# Patient Record
Sex: FEMALE | Race: WHITE | NOT HISPANIC OR LATINO | Employment: UNEMPLOYED | URBAN - METROPOLITAN AREA
[De-identification: names, ages, dates, MRNs, and addresses within clinical notes are randomized per-mention and may not be internally consistent; named-entity substitution may affect disease eponyms.]

---

## 2022-04-04 ENCOUNTER — TRANSCRIBE ORDERS (OUTPATIENT)
Dept: OBSTETRICS AND GYNECOLOGY | Facility: HOSPITAL | Age: 22
End: 2022-04-04

## 2022-04-04 DIAGNOSIS — O09.899 HISTORY OF PRETERM DELIVERY, CURRENTLY PREGNANT: ICD-10-CM

## 2022-04-04 DIAGNOSIS — Z82.79 FAMILY HISTORY OF CONGENITAL HEART DEFECT: ICD-10-CM

## 2022-04-04 DIAGNOSIS — O40.3XX0 POLYHYDRAMNIOS IN THIRD TRIMESTER COMPLICATION, SINGLE OR UNSPECIFIED FETUS: Primary | ICD-10-CM

## 2022-04-04 DIAGNOSIS — O99.019 MATERNAL ANEMIA IN PREGNANCY, ANTEPARTUM: ICD-10-CM

## 2022-04-04 DIAGNOSIS — Z98.891 HISTORY OF C-SECTION: ICD-10-CM

## 2022-04-04 DIAGNOSIS — O09.899 SHORT INTERVAL BETWEEN PREGNANCIES AFFECTING PREGNANCY, ANTEPARTUM: ICD-10-CM

## 2022-04-11 ENCOUNTER — OFFICE VISIT (OUTPATIENT)
Dept: OBSTETRICS AND GYNECOLOGY | Facility: HOSPITAL | Age: 22
End: 2022-04-11

## 2022-04-11 ENCOUNTER — HOSPITAL ENCOUNTER (OUTPATIENT)
Dept: WOMENS IMAGING | Facility: HOSPITAL | Age: 22
Discharge: HOME | End: 2022-04-11

## 2022-04-11 VITALS
HEIGHT: 66 IN | DIASTOLIC BLOOD PRESSURE: 56 MMHG | WEIGHT: 200 LBS | SYSTOLIC BLOOD PRESSURE: 97 MMHG | BODY MASS INDEX: 32.14 KG/M2

## 2022-04-11 DIAGNOSIS — Z98.891 HISTORY OF C-SECTION: ICD-10-CM

## 2022-04-11 DIAGNOSIS — Z34.90 PREGNANCY, UNSPECIFIED GESTATIONAL AGE: ICD-10-CM

## 2022-04-11 DIAGNOSIS — O40.3XX0 POLYHYDRAMNIOS IN THIRD TRIMESTER COMPLICATION, SINGLE OR UNSPECIFIED FETUS: ICD-10-CM

## 2022-04-11 DIAGNOSIS — O09.899 HISTORY OF PRETERM DELIVERY, CURRENTLY PREGNANT: ICD-10-CM

## 2022-04-11 DIAGNOSIS — O24.419 GESTATIONAL DIABETES MELLITUS (GDM) IN THIRD TRIMESTER, GESTATIONAL DIABETES METHOD OF CONTROL UNSPECIFIED: ICD-10-CM

## 2022-04-11 DIAGNOSIS — O09.899 SHORT INTERVAL BETWEEN PREGNANCIES AFFECTING PREGNANCY, ANTEPARTUM: ICD-10-CM

## 2022-04-11 DIAGNOSIS — O34.219 PREVIOUS CESAREAN DELIVERY AFFECTING PREGNANCY: ICD-10-CM

## 2022-04-11 DIAGNOSIS — O99.019 MATERNAL ANEMIA IN PREGNANCY, ANTEPARTUM: ICD-10-CM

## 2022-04-11 DIAGNOSIS — Z82.79 FAMILY HISTORY OF CONGENITAL HEART DEFECT: ICD-10-CM

## 2022-04-11 DIAGNOSIS — Z82.79 FAMILY HISTORY OF CONGENITAL HEART DEFECT: Primary | ICD-10-CM

## 2022-04-11 PROCEDURE — 99203 OFFICE O/P NEW LOW 30 MIN: CPT | Performed by: OBSTETRICS & GYNECOLOGY

## 2022-04-11 PROCEDURE — 76811 OB US DETAILED SNGL FETUS: CPT | Performed by: OBSTETRICS & GYNECOLOGY

## 2022-04-11 PROCEDURE — 76811 OB US DETAILED SNGL FETUS: CPT

## 2022-04-11 RX ORDER — VENLAFAXINE HYDROCHLORIDE 37.5 MG/1
37.5 CAPSULE, EXTENDED RELEASE ORAL DAILY
COMMUNITY
End: 2022-10-20 | Stop reason: HOSPADM

## 2022-04-11 RX ORDER — FERROUS SULFATE 325(65) MG
325 TABLET ORAL
COMMUNITY
End: 2022-10-20 | Stop reason: HOSPADM

## 2022-04-11 RX ORDER — PRENATAL WITH FERROUS FUM AND FOLIC ACID 3080; 920; 120; 400; 22; 1.84; 3; 20; 10; 1; 12; 200; 27; 25; 2 [IU]/1; [IU]/1; MG/1; [IU]/1; MG/1; MG/1; MG/1; MG/1; MG/1; MG/1; UG/1; MG/1; MG/1; MG/1; MG/1
1 TABLET ORAL DAILY
COMMUNITY
End: 2022-10-20 | Stop reason: HOSPADM

## 2022-04-11 RX ORDER — HYDROXYZINE HYDROCHLORIDE 10 MG/1
10 TABLET, FILM COATED ORAL 3 TIMES DAILY PRN
COMMUNITY
End: 2022-10-20 | Stop reason: HOSPADM

## 2022-04-11 NOTE — PROGRESS NOTES
Denies any complaints.  Was seen at local hospital last evening for contractions. Cervix was not checked.  Next OB appt 4/12.

## 2022-04-15 ENCOUNTER — HOSPITAL ENCOUNTER (OUTPATIENT)
Dept: DIABETES SERVICES | Facility: HOSPITAL | Age: 22
Setting detail: RECURRING SERIES
Discharge: HOME | End: 2022-04-15

## 2022-04-15 PROCEDURE — G0108 DIAB MANAGE TRN  PER INDIV: HCPCS

## 2022-04-15 NOTE — CONSULTS
Patient attended their scheduled gestational diabetes education visit.  Please see media tab for assessment and notes if you use EPIC. If you are not an EPIC user a copy of patient's assessment and notes will be sent per routine. Thank you.

## 2022-04-26 ENCOUNTER — HOSPITAL ENCOUNTER (OUTPATIENT)
Dept: DIABETES SERVICES | Facility: HOSPITAL | Age: 22
Setting detail: RECURRING SERIES
Discharge: HOME | End: 2022-04-26

## 2022-04-26 NOTE — CONSULTS
DIABETES EDUCATION CONSULT, 20 minute GDM follow up.  This medical referred consult was provided as a telephone call, tele-health or e-visit, as patient is unable to attend an in-office appointment due to the COVID-19 crisis. Consent for treatment was given verbally.Please see media tab for assessment and notes if you use EPIC. If you are not an EPIC user a copy of patient's assessment and notes will be sent per routine. Thank you.

## 2022-05-09 ENCOUNTER — HOSPITAL ENCOUNTER (OUTPATIENT)
Dept: WOMENS IMAGING | Facility: HOSPITAL | Age: 22
End: 2022-05-09

## 2022-05-23 ENCOUNTER — HOSPITAL ENCOUNTER (OUTPATIENT)
Dept: WOMENS IMAGING | Facility: HOSPITAL | Age: 22
Discharge: HOME | End: 2022-05-23
Admitting: OBSTETRICS & GYNECOLOGY

## 2022-05-23 ENCOUNTER — OFFICE VISIT (OUTPATIENT)
Dept: OBSTETRICS AND GYNECOLOGY | Facility: HOSPITAL | Age: 22
End: 2022-05-23

## 2022-05-23 VITALS — WEIGHT: 198.2 LBS | DIASTOLIC BLOOD PRESSURE: 65 MMHG | BODY MASS INDEX: 31.99 KG/M2 | SYSTOLIC BLOOD PRESSURE: 102 MMHG

## 2022-05-23 DIAGNOSIS — O40.3XX0 POLYHYDRAMNIOS IN THIRD TRIMESTER COMPLICATION, SINGLE OR UNSPECIFIED FETUS: Primary | ICD-10-CM

## 2022-05-23 DIAGNOSIS — O24.419 GESTATIONAL DIABETES MELLITUS (GDM) IN THIRD TRIMESTER, GESTATIONAL DIABETES METHOD OF CONTROL UNSPECIFIED: ICD-10-CM

## 2022-05-23 DIAGNOSIS — O34.219 PREVIOUS CESAREAN DELIVERY AFFECTING PREGNANCY: ICD-10-CM

## 2022-05-23 DIAGNOSIS — O40.3XX0 POLYHYDRAMNIOS IN THIRD TRIMESTER COMPLICATION, SINGLE OR UNSPECIFIED FETUS: ICD-10-CM

## 2022-05-23 DIAGNOSIS — Z34.90 PREGNANCY, UNSPECIFIED GESTATIONAL AGE: ICD-10-CM

## 2022-05-23 DIAGNOSIS — Z82.79 FAMILY HISTORY OF CONGENITAL HEART DEFECT: ICD-10-CM

## 2022-05-23 PROCEDURE — 99214 OFFICE O/P EST MOD 30 MIN: CPT | Performed by: OBSTETRICS & GYNECOLOGY

## 2022-05-23 PROCEDURE — 76819 FETAL BIOPHYS PROFIL W/O NST: CPT

## 2022-05-23 PROCEDURE — 76819 FETAL BIOPHYS PROFIL W/O NST: CPT | Performed by: OBSTETRICS & GYNECOLOGY

## 2022-05-23 PROCEDURE — 76816 OB US FOLLOW-UP PER FETUS: CPT | Performed by: OBSTETRICS & GYNECOLOGY

## 2022-05-23 PROCEDURE — 76816 OB US FOLLOW-UP PER FETUS: CPT

## 2022-05-23 NOTE — PROGRESS NOTES
Patient seen in Maternal Fetal Medicine clinic today. Please see full note in under imaging tab of patient chart in Epic (Viewpoint report).    Gabbie Land MD

## 2022-06-08 DIAGNOSIS — I48.91 ATRIAL FIBRILLATION, UNSPECIFIED TYPE: Primary | ICD-10-CM

## 2022-06-20 ENCOUNTER — OFFICE VISIT (OUTPATIENT)
Dept: CARDIOLOGY | Facility: CLINIC | Age: 22
End: 2022-06-20

## 2022-06-20 VITALS
DIASTOLIC BLOOD PRESSURE: 83 MMHG | HEIGHT: 68 IN | SYSTOLIC BLOOD PRESSURE: 102 MMHG | HEART RATE: 74 BPM | BODY MASS INDEX: 26.67 KG/M2 | WEIGHT: 176 LBS

## 2022-06-20 DIAGNOSIS — I48.91 ATRIAL FIBRILLATION, UNSPECIFIED TYPE: Primary | ICD-10-CM

## 2022-06-20 PROCEDURE — 99203 OFFICE O/P NEW LOW 30 MIN: CPT | Performed by: INTERNAL MEDICINE

## 2022-06-20 RX ORDER — OXYCODONE HYDROCHLORIDE AND ACETAMINOPHEN 5; 325 MG/1; MG/1
1 TABLET ORAL EVERY 6 HOURS PRN
COMMUNITY
End: 2022-10-20 | Stop reason: HOSPADM

## 2022-06-20 NOTE — PROGRESS NOTES
Chief Complaint  Atrial Fibrillation    Subjective            Paola Bullard presents to [facility] CARDIOLOGY  History of Present Illness    Paola is a 22-year-old white female.  She had a recent planned  and after this had transient atrial fibrillation while in the hospital.  She converted promptly back to sinus rhythm.  She had mild chest pressure during this arrhythmia.  Her laboratory studies were unremarkable.  She had a Holter monitor since discharge and her heart rate was 55-1 76 with no arrhythmias, sinus mechanism.  Baseline EKG  showed sinus rhythm.  Since discharge she has not had any further palpitations or chest discomfort.  She had no previous cardiac rhythm issues in the past.    PMH  Past Medical History:   Diagnosis Date   • Atrial fibrillation (HCC)    • Bipolar disorder (HCC)    • HSV infection          SURGICALHX  Past Surgical History:   Procedure Laterality Date   •  SECTION  2018   •  SECTION  2021   •  SECTION PRIMARY  2016   • CHOLECYSTECTOMY     • TONSILLECTOMY          SOC  Social History     Socioeconomic History   • Marital status:    Tobacco Use   • Smoking status: Never Smoker   • Smokeless tobacco: Never Used   Vaping Use   • Vaping Use: Former   • Substances: Flavoring   Substance and Sexual Activity   • Alcohol use: Not Currently   • Drug use: Never   • Sexual activity: Defer         FAMHX  Family History   Problem Relation Age of Onset   • No Known Problems Mother    • Stroke Father           ALLERGY  No Known Allergies     MEDSCURRENT    Current Outpatient Medications:   •  hydrOXYzine (ATARAX) 10 MG tablet, Take 10 mg by mouth 3 (Three) Times a Day As Needed for Itching., Disp: , Rfl:   •  oxyCODONE-acetaminophen (PERCOCET) 5-325 MG per tablet, Take 1 tablet by mouth Every 6 (Six) Hours As Needed., Disp: , Rfl:   •  venlafaxine XR (EFFEXOR-XR) 37.5 MG 24 hr capsule, Take 37.5 mg by mouth Daily., Disp:  ", Rfl:   •  ferrous sulfate 325 (65 FE) MG tablet, Take 325 mg by mouth Daily With Breakfast., Disp: , Rfl:   •  Prenatal Vit-Fe Fumarate-FA (Prenatal 27-) 27-1 MG tablet tablet, Take 1 tablet by mouth Daily., Disp: , Rfl:       Review of Systems   Constitutional: Negative.   HENT: Negative.    Eyes: Negative.    Cardiovascular: Negative for chest pain, irregular heartbeat and palpitations.   Respiratory: Negative.  Negative for shortness of breath.    Endocrine: Negative.    Hematologic/Lymphatic: Negative.    Skin: Negative.    Musculoskeletal: Negative.    Gastrointestinal: Negative.    Genitourinary: Negative.    Neurological: Negative.    Psychiatric/Behavioral: Negative.         Objective     /83   Pulse 74   Ht 172.7 cm (68\")   Wt 79.8 kg (176 lb)   BMI 26.76 kg/m²       General Appearance:   · well developed  · well nourished  HENT:   · oropharynx moist  · lips not cyanotic  Neck:  · thyroid not enlarged  · supple  Respiratory:  · no respiratory distress  · normal breath sounds  · no rales  Cardiovascular:  · no jugular venous distention  · regular rhythm  · apical impulse normal  · S1 normal, S2 normal  · no S3, no S4   · no murmur  · no rub, no thrill  · carotid pulses normal; no bruit  · pedal pulses normal  · lower extremity edema: none    Musculoskeletal:  · no clubbing of fingers.   · normocephalic, head atraumatic  Skin:   · warm, dry  Psychiatric:  · judgement and insight appropriate  · normal mood and affect      Result Review :             Data reviewed: Primary care records reviewed, hospital records reviewed, EKG and Holter reviewed     Procedures             Assessment and Plan        ASSESSMENT:  Encounter Diagnosis   Name Primary?   • Atrial fibrillation, unspecified type (HCC) Yes         PLAN:    1.  Paola had atrial fibrillation transiently just after  delivery.  She converted back to sinus rhythm spontaneously and recent Holter monitor showed no evidence of recurrence.  " She has no significant embolic risk factors.  An echo will be scheduled to evaluate for any structural abnormalities.  Otherwise no additional treatment is needed at this time.  If she has symptoms of recurrent atrial fibrillation event monitoring may be helpful but this seemed to be a transient event.  2.  We will discuss the patient's echo when available            Patient was given instructions and counseling regarding her condition or for health maintenance advice. Please see specific information pulled into the AVS if appropriate.             ARMANI Vieyra MD  6/20/2022    15:44 EDT

## 2022-10-18 ENCOUNTER — HOSPITAL ENCOUNTER (INPATIENT)
Facility: HOSPITAL | Age: 22
LOS: 2 days | Discharge: HOME | End: 2022-10-20
Attending: PSYCHIATRY & NEUROLOGY | Admitting: PSYCHIATRY & NEUROLOGY

## 2022-10-18 PROBLEM — F32.A DEPRESSION, UNSPECIFIED DEPRESSION TYPE: Status: ACTIVE | Noted: 2022-10-18

## 2022-10-18 RX ORDER — ACETAMINOPHEN 325 MG/1
650 TABLET ORAL EVERY 6 HOURS PRN
Status: DISCONTINUED | OUTPATIENT
Start: 2022-10-18 | End: 2022-10-20 | Stop reason: HOSPADM

## 2022-10-18 RX ORDER — LURASIDONE HYDROCHLORIDE 40 MG/1
40 TABLET, FILM COATED ORAL DAILY
COMMUNITY

## 2022-10-18 RX ORDER — ALUMINA, MAGNESIA, AND SIMETHICONE 2400; 2400; 240 MG/30ML; MG/30ML; MG/30ML
15 SUSPENSION ORAL EVERY 6 HOURS PRN
Status: DISCONTINUED | OUTPATIENT
Start: 2022-10-18 | End: 2022-10-20 | Stop reason: HOSPADM

## 2022-10-18 RX ORDER — LOPERAMIDE HYDROCHLORIDE 2 MG/1
2 CAPSULE ORAL
Status: DISCONTINUED | OUTPATIENT
Start: 2022-10-18 | End: 2022-10-20 | Stop reason: HOSPADM

## 2022-10-18 RX ORDER — HALOPERIDOL 5 MG/ML
5 INJECTION INTRAMUSCULAR EVERY 4 HOURS PRN
Status: DISCONTINUED | OUTPATIENT
Start: 2022-10-18 | End: 2022-10-20 | Stop reason: HOSPADM

## 2022-10-18 RX ORDER — BUSPIRONE HYDROCHLORIDE 10 MG/1
10 TABLET ORAL 2 TIMES DAILY
Status: ON HOLD | COMMUNITY
End: 2022-10-20 | Stop reason: SDUPTHER

## 2022-10-18 RX ORDER — HALOPERIDOL 5 MG/1
5 TABLET ORAL EVERY 4 HOURS PRN
Status: DISCONTINUED | OUTPATIENT
Start: 2022-10-18 | End: 2022-10-20 | Stop reason: HOSPADM

## 2022-10-18 RX ORDER — FAMOTIDINE 20 MG/1
20 TABLET, FILM COATED ORAL 2 TIMES DAILY PRN
Status: DISCONTINUED | OUTPATIENT
Start: 2022-10-18 | End: 2022-10-20 | Stop reason: HOSPADM

## 2022-10-18 NOTE — NURSING NOTE
Pt arrived to [facility] at approx 1820 escorted by security x 2 and police officers x 2.  Pt was calm and cooperative with search, orientation to unit, and initial assessment.  Pt reports that she is here because her therapist thinks that she tried to kill herself.  Pt reports that she had an intentional overdose on 8/17/22 and therefore her medications are monitored.  Pt reports that grabbed hher medication bottle that was next to her 's and took his Atarax by mistake.  Pt denies that this was an intentional overdose.  Pt reports that she feels that her medications are working and denies SI since starting treatment in August.  Pt denies HI or a/v hallucinations.  Pt reports some increased stress r/t family issues (10 family members living in home.)  Pt reports that she in in school for phlebotomy at [employer] and is worried about missing class.  Pt is  and has 4 children ages 6, 4, 1 and 4 months.  Pt reports that  After the overdose in August she felt that she still needed help and attempted treatment at [facility] and [facility].  According to [facility]W office, pt left AMA each time.  Pt contracts for safety and verablizes understanding of close watch policy.  MHT remains at bedside. Pt exhibits no s/s of acute distress at this time.

## 2022-10-19 PROBLEM — F33.1 MAJOR DEPRESSIVE DISORDER, RECURRENT EPISODE, MODERATE: Status: ACTIVE | Noted: 2022-10-19

## 2022-10-19 PROCEDURE — 93010 ELECTROCARDIOGRAM REPORT: CPT | Performed by: INTERNAL MEDICINE

## 2022-10-19 PROCEDURE — 93005 ELECTROCARDIOGRAM TRACING: CPT | Performed by: PSYCHIATRY & NEUROLOGY

## 2022-10-19 RX ORDER — CARVEDILOL 6.25 MG/1
6.25 TABLET ORAL 2 TIMES DAILY WITH MEALS
Status: DISCONTINUED | OUTPATIENT
Start: 2022-10-19 | End: 2022-10-20 | Stop reason: HOSPADM

## 2022-10-19 RX ORDER — DULOXETIN HYDROCHLORIDE 30 MG/1
60 CAPSULE, DELAYED RELEASE ORAL DAILY
Status: DISCONTINUED | OUTPATIENT
Start: 2022-10-19 | End: 2022-10-20 | Stop reason: HOSPADM

## 2022-10-19 RX ORDER — LAMOTRIGINE 100 MG/1
50 TABLET ORAL 2 TIMES DAILY
Status: DISCONTINUED | OUTPATIENT
Start: 2022-10-19 | End: 2022-10-20 | Stop reason: HOSPADM

## 2022-10-19 RX ORDER — BUSPIRONE HYDROCHLORIDE 10 MG/1
10 TABLET ORAL 2 TIMES DAILY
Status: DISCONTINUED | OUTPATIENT
Start: 2022-10-19 | End: 2022-10-20 | Stop reason: HOSPADM

## 2022-10-19 RX ORDER — LURASIDONE HYDROCHLORIDE 40 MG/1
40 TABLET, FILM COATED ORAL DAILY
Status: DISCONTINUED | OUTPATIENT
Start: 2022-10-19 | End: 2022-10-20 | Stop reason: HOSPADM

## 2022-10-19 RX ORDER — HYDROXYZINE PAMOATE 25 MG/1
25 CAPSULE ORAL EVERY 6 HOURS PRN
Status: DISCONTINUED | OUTPATIENT
Start: 2022-10-19 | End: 2022-10-20 | Stop reason: HOSPADM

## 2022-10-19 RX ADMIN — DULOXETINE 60 MG: 30 CAPSULE, DELAYED RELEASE ORAL at 13:50

## 2022-10-19 RX ADMIN — HYDROXYZINE PAMOATE 25 MG: 25 CAPSULE ORAL at 21:32

## 2022-10-19 RX ADMIN — BUSPIRONE HYDROCHLORIDE 10 MG: 10 TABLET ORAL at 20:53

## 2022-10-19 RX ADMIN — LAMOTRIGINE 100 MG: 100 TABLET ORAL at 20:53

## 2022-10-19 RX ADMIN — CARVEDILOL 6.25 MG: 6.25 TABLET, FILM COATED ORAL at 17:11

## 2022-10-19 NOTE — PLAN OF CARE
Goal Outcome Evaluation:  Plan of Care Reviewed With: patient  Patient Agreement with Plan of Care: agrees      NURSING NOTE:  PT REPORTS THAT SHE SPOKE TO HER  THIS MORNING AND STATED HER MOOD WAS PRETTY GOOD.  PT DENIES SI, HI, OR HALLUCINATIONS RATED HER DEPRESSION A 3/10, ANXIETY A 6/10 AND HAS HOPE FOR THE FUTURE.  STATES SHE DOES OUTPATIENT THERAPY WITH [facility] IN [place], [place].  PT AFFECT IS FLAT.  DENIES SI, HI, OR HALLUCINATIONS.  SOMEWHAT DIFFICULT TO ENGAGE.  REPORTS SHE ACCIDENTALLY TOOK THE WRONG BOTTLE OF PILLS.  STATES HER BOTTLE AND HER HUSBANDS BOTTLE WERE BOTH SITTING OUT.  STATES HER  WORKS THIRD SHIFT AND SHE HAS BEEN TRYING TO GET A HOLD OF HIM; HOWEVER, HE HAS NOT ANSWERED.  DR. MCKEON HAD WANTED THE FAMILY TO BE TALKED TO FOR COLLATERAL INPUT.   ATTEMPTED TO CALL SPOUSE AS WELL WITH NO ANSWER.  PT IS CONCERNED ABOUT MISSING HER CLASS IN THE AM.  SINCE PT WAS UNABLE TO CONTACT , LATUDA WAS NOT BROUGHT IN SO UNABLE TO ADMINISTER MEDICATION.  WILL CONTINUE TO MONITOR AND FOLLOW CURRENT TX PLAN.

## 2022-10-19 NOTE — PLAN OF CARE
Goal Outcome Evaluation:  Plan of Care Reviewed With:  (Pt sleeping at this time.)  Patient Agreement with Plan of Care:  (Pt sleeping at this time.)      Pt on unit at shift changed, after admitted, pt laid down and went to sleep. CWA at bedside. Will monitor for safety and mood.

## 2022-10-19 NOTE — H&P
"[facility]   PSYCHIATRIC  HISTORY AND PHYSICAL    Patient Name: Paola Bullard  : 2000  MRN: [id number]  Primary Care Physician:  Devika, No Known  Date of admission: 10/18/2022    Subjective   Subjective     Chief Complaint: \"It was an accident that, I did not do it on purpose.  I took my 's medications by mistake\"    HPI:     Paola Bullard is a 22 y.o. female admitted on a mental Inquest warrant.  Patient presented to an [facility] facility with an overdose of Atarax.  The patient's mother-in-law manages her medications.  Mother-in-law came into the bedroom yesterday saw the bottle of pills had not been taking and asking why she had not taken her night meds.  She says that she told her she did take her medications and realized that she had picked up a bottle of Atarax which was prescribed for her .  She stated that she just took the wrong pills accident.  She reports she had no suicidal thoughts or ideations.  She reported her outpatient therapist has recently documented suicidal thoughts with plans of overdose.    Patient reports that she is depressed.  She reports that it has been much better lately.  She reports that she recently has been started on lamotrigine for her depression and its was recently increased to 50 mg.  She is agreeable to increase to 100 mg.  She is also been on Latuda, duloxetine, buspirone and would like to continue those medicines.  She reports very relatively new and have been helpful.  Reports overall mood has been better but she does acknowledge depression.    She is quite adamant that she had no suicidal ideations and continues to deny suicidal ideations.  She is calm and cooperative.    Reports her sleep is been good, but energy has been \"pretty low.\"  She denies feeling hopeless or helpless.  Reports feeling a little helpless today because no one will listen to her or believe her story.          Review of Systems:      CONSTITUTIONAL: no fever, no night " sweats  HEENT: no blurring of vision, no double vision, no hearing difficulty, no tinnitus, no dysplasia, no epistaxis  LUNGS: no cough, no hemoptysis, no wheeze, no shortness of breath;  CARDIOVASCULAR: no palpitation, no chest pain, no shortness of breath;  GI: no abdominal pain, no nausea, no vomiting, no diarrhea, no constipation;  LILLIANA: no dysuria, no hematuria, no frequency or urgency, no nephrolithiasis;  MUSCULOSKELETAL: no joint pain, no swelling, no stiffness;  ENDOCRINE: no polyuria, no polydipsia, no cold or heat intolerance;  HEMATOLOGY: no easy bruising or bleeding, no history of clotting disorder;  DERMATOLOGY: no skin rash, no eczema, no pruritus;  NEUROLOGY: no syncope, no seizures, no numbness or tingling of hands, no numbness or tingling of feet, no paresis;  PSYCHIATRIC: As documented in HPI    Personal History     Past Medical History:   Diagnosis Date   • Atrial fibrillation (HCC)    • Bipolar disorder (HCC)    • HSV infection        Past Surgical History:   Procedure Laterality Date   •  SECTION  2018   •  SECTION  2021   •  SECTION PRIMARY  2016   • CHOLECYSTECTOMY     • TONSILLECTOMY         Past Psychiatric History: Currently under the care of [facility].    Psychiatric Hospitalizations: 4 previous hospitalizations and 3 have been in the last 3 to 4 months.  This is her first hospitalization here    Suicide Attempts: History of 1 suicide attempt long time ago believe in her teenage years    Prior Treatment and Medications Tried: Multiple medication trials, most recent regimen has been effective      Family History: family history includes Alcohol abuse in her father; Depression in her brother, father, mother, and sister; Hypertension in her father; Stroke in her father. Otherwise pertinent FHx was reviewed and not pertinent to current issue.    Family Suicide History: No known the patient    Social History:     Born in [facility],  moved to [place] at age 8.  She quit school in ninth grade because she was pregnant, but did go back and graduated in 2020.  She is a stay-at-home mom.  He is currently in school for phlebotomy.  She has been  for 67 years and her  is the father of all 4 of her children which are aged 6, 4, 19 months, and 4 months old.  She never been in .  She is not Muslim or spiritual.      Denies any history of abuse.    Social History     Socioeconomic History   • Marital status:    Tobacco Use   • Smoking status: Never   • Smokeless tobacco: Never   Vaping Use   • Vaping Use: Former   • Substances: Flavoring   Substance and Sexual Activity   • Alcohol use: Not Currently   • Drug use: Never   • Sexual activity: Defer       Substance Abuse History: reports that she has never smoked. She has never used smokeless tobacco. She reports that she does not currently use alcohol. She reports that she does not use drugs.    Home Medications:   Carvedilol, Prenatal 27-1, busPIRone, ferrous sulfate, hydrOXYzine, lamoTRIgine, lurasidone, oxyCODONE-acetaminophen, and venlafaxine XR      Allergies:  No Known Allergies    Objective   Objective     Vitals:   Temp:  [97.9 °F (36.6 °C)-98.9 °F (37.2 °C)] 98.3 °F (36.8 °C)  Heart Rate:  [88-95] 88  Resp:  [18] 18  BP: (109-134)/(68-86) 109/68    Physical Exam:      CONSTITUTIONAL: Patient is well developed, well nourished, awake and alert.  HEENT: Head and neck are normocephalic and atraumatic. Pupils equal and  round.  Sclerae clear. No icterus.  LUNGS: Even unlabored respirations.  CARDIAC: Normal rate and rhythm.  ABDOMEN: Nondistended.  SKIN: Clean, dry, intact.  EXTREMITIES: No clubbing, cyanosis, edema.  MUSCULOSKELETAL: Symmetric body habitus. Spine straight. Strength intact,  full range of motion.  NEUROLOGIC: Appropriate. No abnormal movements, good muscle tone.                              Cerebellar: station and gait steady.  Cranial Nerves:  CN II:  "Visual fields without deficit.  CN III: Pupils symmetric.  CN III, IV, VI:  Extraocular eye muscles intact, no nystagmus.  CN V: Jaw open and closing normal.  CN VII: Frown and smile symmetric.  CN VIII: Hearing intact.  CN IX, X: Palate rise normal; phonation without hoarseness.  CN XI: Shoulder shrug equal.  CN XII: Tongue midline, no fasciculations, no dysarthria.    Mental Status Exam:        Hygiene:   good  Cooperation:  Cooperative  Eye Contact:  Good  Psychomotor Behavior:  Appropriate  Affect:  Blunted  Mood: \"Confused, tired\"  Speech:  Normal  Language: Appropriate, relevant  Thought Process:  Goal directed and Linear  Thought Content:  Normal  Suicidal:  None and Denies and reports yesterday was not a suicide attempt  Homicidal:  None  Hallucinations:  None  Delusion:  None  Memory:  Intact  Orientation:  Person, Place, Time and Situation  Reliability:  good  Insight:  Good  Judgement:  Good  Impulse Control:  Fair        Result Review    Result Review:  I have personally reviewed the results from the time of this admission to 10/19/2022 13:09 EDT and agree with these findings:  [x]  Laboratory  []  Microbiology  []  Radiology  []  EKG/Telemetry   []  Cardiology/Vascular   []  Pathology  []  Old records  []  Other:  Most notable findings include: Reviewed from outlying facility, low white blood cell count, otherwise all are normal, toxicology screen negative    Assessment & Plan   Assessment / Plan     Brief Patient Summary:  Paola Bullard is a 22 y.o. female who admitted on a mental Inquest warrant status post overdose of medication which she reports is unintentional    Active Hospital Problems:  Active Hospital Problems    Diagnosis    • Major depressive disorder, recurrent episode, moderate (HCC)    • Atrial fibrillation (HCC)        Plan:   We will need collateral information from family  Patient is agreeable to increase lamotrigine to 100 mg  We will continue other home meds as ordered  Admit for " safety and stabilization and begin treatment for underlying mood disorder or psychosis with appropriate medications  Attempt to gain collateral information of possible  Work on safety plan  Provide supportive therapy  Patient to engage in all group and individual treatment modalities available including milieu therapy  Work on appropriate disposition follow-up  Estimated length of stay in hospital 4 to 5 days      DVT prophylaxis:  Mechanical DVT prophylaxis orders are present.    CODE STATUS:    Code Status (Patient has no pulse and is not breathing): CPR (Attempt to Resuscitate)  Medical Interventions (Patient has pulse or is breathing): Full Support      Admission Status:  I believe this patient meets inpatient status.    Part of this note may be an electronic transcription/translation of spoken language to printed text using the Dragon dictation system.  Every attempt has been made to correct errors but some transcription errors may be present in the body of the note.    Electronically signed by Nick Calderon MD, 10/19/22, 12:58 PM EDT.

## 2022-10-19 NOTE — CASE MANAGEMENT/SOCIAL WORK
T attempted contact with spouse via phone call. Not able to speak with pt spouse, lvm with call back number.

## 2022-10-19 NOTE — NURSING NOTE
Nursing note:  Pt  called unit and stated she took his anxiety medication by mistake.  States pt has had two previous suicide attempts in September.  States to limit her access to medications they put her scheduled meds in a bottle at the time they are due.  There were some arguments that day and his old bottle of anxiety meds got left out with hers and she took his bottle of meds instead of her bottle. States when she realized what happened she was taken to the hospital.   stated that there are 8-10 people living in the house.  Spouse states he works from 4:30 pm until 5:00 am.  Instructed him to call unit around 7am or so.

## 2022-10-20 VITALS
RESPIRATION RATE: 16 BRPM | SYSTOLIC BLOOD PRESSURE: 123 MMHG | HEIGHT: 68 IN | TEMPERATURE: 98.3 F | WEIGHT: 190 LBS | BODY MASS INDEX: 28.79 KG/M2 | DIASTOLIC BLOOD PRESSURE: 76 MMHG | HEART RATE: 80 BPM | OXYGEN SATURATION: 100 %

## 2022-10-20 LAB
BASOPHILS # BLD AUTO: 0.03 10*3/MM3 (ref 0–0.2)
BASOPHILS NFR BLD AUTO: 0.7 % (ref 0–1.5)
DEPRECATED RDW RBC AUTO: 38.5 FL (ref 37–54)
EOSINOPHIL # BLD AUTO: 0.33 10*3/MM3 (ref 0–0.4)
EOSINOPHIL NFR BLD AUTO: 7.7 % (ref 0.3–6.2)
ERYTHROCYTE [DISTWIDTH] IN BLOOD BY AUTOMATED COUNT: 13.2 % (ref 12.3–15.4)
HCT VFR BLD AUTO: 34.1 % (ref 34–46.6)
HGB BLD-MCNC: 11.3 G/DL (ref 12–15.9)
IMM GRANULOCYTES # BLD AUTO: 0.01 10*3/MM3 (ref 0–0.05)
IMM GRANULOCYTES NFR BLD AUTO: 0.2 % (ref 0–0.5)
LYMPHOCYTES # BLD AUTO: 1.49 10*3/MM3 (ref 0.7–3.1)
LYMPHOCYTES NFR BLD AUTO: 34.7 % (ref 19.6–45.3)
MCH RBC QN AUTO: 26.9 PG (ref 26.6–33)
MCHC RBC AUTO-ENTMCNC: 33.1 G/DL (ref 31.5–35.7)
MCV RBC AUTO: 81.2 FL (ref 79–97)
MONOCYTES # BLD AUTO: 0.62 10*3/MM3 (ref 0.1–0.9)
MONOCYTES NFR BLD AUTO: 14.5 % (ref 5–12)
NEUTROPHILS NFR BLD AUTO: 1.81 10*3/MM3 (ref 1.7–7)
NEUTROPHILS NFR BLD AUTO: 42.2 % (ref 42.7–76)
NRBC BLD AUTO-RTO: 0 /100 WBC (ref 0–0.2)
PLATELET # BLD AUTO: 199 10*3/MM3 (ref 140–450)
PMV BLD AUTO: 9.7 FL (ref 6–12)
RBC # BLD AUTO: 4.2 10*6/MM3 (ref 3.77–5.28)
WBC NRBC COR # BLD: 4.29 10*3/MM3 (ref 3.4–10.8)

## 2022-10-20 PROCEDURE — 85025 COMPLETE CBC W/AUTO DIFF WBC: CPT | Performed by: PSYCHIATRY & NEUROLOGY

## 2022-10-20 RX ORDER — DULOXETIN HYDROCHLORIDE 60 MG/1
60 CAPSULE, DELAYED RELEASE ORAL DAILY
Qty: 30 CAPSULE | Refills: 1 | Status: SHIPPED | OUTPATIENT
Start: 2022-10-21

## 2022-10-20 RX ORDER — BUSPIRONE HYDROCHLORIDE 10 MG/1
10 TABLET ORAL 2 TIMES DAILY
Qty: 60 TABLET | Refills: 1 | Status: SHIPPED | OUTPATIENT
Start: 2022-10-20

## 2022-10-20 RX ORDER — LAMOTRIGINE 25 MG/1
50 TABLET ORAL 2 TIMES DAILY
Qty: 120 TABLET | Refills: 1 | Status: SHIPPED | OUTPATIENT
Start: 2022-10-20

## 2022-10-20 RX ADMIN — BUSPIRONE HYDROCHLORIDE 10 MG: 10 TABLET ORAL at 08:27

## 2022-10-20 RX ADMIN — CARVEDILOL 6.25 MG: 6.25 TABLET, FILM COATED ORAL at 08:27

## 2022-10-20 RX ADMIN — DULOXETINE 60 MG: 30 CAPSULE, DELAYED RELEASE ORAL at 08:27

## 2022-10-20 RX ADMIN — LAMOTRIGINE 50 MG: 100 TABLET ORAL at 08:27

## 2022-10-20 NOTE — CASE MANAGEMENT/SOCIAL WORK
Date of Discharge: 10-20-22    Presenting Problem/History of Present Illness- Major depressive disorder, recurrent episode, moderate    Hospital Course  Patient is a 22 y.o. female presented with Depression, unspecified depression type. Patient treated and stabilized.        Condition on Discharge:  Stabilize mood, patient discharged home where she lives with her spouse, children and in laws, and is scheduled with [facility]     Follow-up Appointments  Patient is scheduled for outpatient behavioral health with [facility] on 10-21-22 at 11:00 am for medication management and on 10-24-22 1:30 pm for Therapy.       JENNIFFER Guy  10/20/22

## 2022-10-20 NOTE — PLAN OF CARE
Goal Outcome Evaluation:  Plan of Care Reviewed With: patient  Patient Agreement with Plan of Care: agrees      Pt cooperative, med compliant. Denied SI, HI, A/V Hallucinations, depression. Pt stated she was having anxiety at bedtime, new order received by on call provider for vistaril 25mg q6h prn. Pt focused on discharge,  spoke to day shift nurse and per pt. Will try to call early in the morning to speak with  or provider.

## 2022-10-20 NOTE — PLAN OF CARE
Goal Outcome Evaluation:  Plan of Care Reviewed With: patient  Patient Agreement with Plan of Care: agrees      Pt. Is quiet and cooperative, reports that she is feeling better other than some anxiety and that she feels like she is ready to be discharged. Pt. Denied any si/hi/avh and contracted for safety. Pt. Reports open to follow ing up out patient with [facility], will con't to monitor and provide a safe environment.

## 2022-10-20 NOTE — CASE MANAGEMENT/SOCIAL WORK
Collateral with pt spouse who confirmed pt's accidental use of his medications. Spouse shared pt previous attempts, current living arrangements and pt compliance with outpatient behavioral health. Spouse informed T that due to pt previous attempt last month, her medications are organized and given by his family member. Spouse confirmed pt will discharge back home, where pt lives with spouse, four children and in laws. Confirmed pt pharmacy with pt spouse, spouse aware to follow up with pharmacy to  medications and pt follow up with [facility] for outpatient treatment.

## 2022-10-20 NOTE — CASE MANAGEMENT/SOCIAL WORK
Attempt to make contact with pt spouse x2. Call is directed to vm that is currently full. T will wait for spouse to call back.

## 2022-10-20 NOTE — DISCHARGE SUMMARY
[facility]         DISCHARGE SUMMARY    Patient Name: Paola Bullard  : 2000  MRN: [id number]    Date of Admission: 10/18/2022  Date of Discharge: 10/20/2022  Primary Care Physician: Provider, No Known    Consults     No orders found from 2022 to 10/19/2022.          Presenting Problem:   Depression, unspecified depression type [F32.A]    Active and Resolved Hospital Problems:  Active Hospital Problems    Diagnosis POA   • Major depressive disorder, recurrent episode, moderate (HCC) [F33.1] Yes      Resolved Hospital Problems    Diagnosis POA   • Atrial fibrillation (HCC) [I48.91] Yes         Hospital Course     Hospital Course:  Paola Bullard is a 22 y.o. female admitted on [facility] for depression with overdose and possible suicide attempt.    Patient was denying suicidal ideation or suicide attempt with the overdose of medications.  Family does manage her medication to her on a daily basis because of an intentional overdose 3 to 4 weeks ago where she had a subsequent hospitalization at another facility.  Her mother-in-law, who manages her medicines, asked her why she did not take her medications that night and the patient said she had.  Further investigation found at the 's medicine bottle which was close to hers was empty.  Patient reports that she inadvertently picked up her 's medicines and took them.  She took more than prescribed Atarax and had no sequelae or medical issues because of the overdose.  The story is been corroborated by patient's  was called talk to the unit.    Patient just recently been started on a regimen of medication that she thought was helpful in treating her depression.  She felt it was too early to make any changes to this but did acknowledge she continued to have some depression.  She was recently started on lamotrigine and been titrated up to 50 mg and she was agreeable to an increase of this medicine to 100 mg.  She is now taking lamotrigine  "50 mg twice daily.    Patient's been calm and cooperative throughout her stay.  She continues to deny suicidal ideation.  Families corroborated her story of accidental overdose.  There is concern that she has been depressed and she had a recent attempt but at this time she is future oriented and goal directed.  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  No acute anxiety or agitation.  She remains free of suicidal ideation.  She been compliant with treatment.  She has appropriate follow-up set.  She is future and goal directed help and get to class today.    Date of discharge she is calm and cooperative.  She makes good eye contact.  She participates fully in exam.  Speech is articulate, fluent, normal rate and volume.  Language is appropriate relevant.  Mood is described as \"okay\" and her affect is euthymic.  Thought processes are sequential and goal directed.  Thought content is negative for suicidal homicidal ideations and auditory visual hallucinations Insight and judgment are intact.    Patient requesting discharge at this point is able to identify a safety plan has appropriate follow-up needs been established that it was an unintentional overdose and the patient may to be discharged.        DISCHARGE Follow Up Recommendations for labs and diagnostics: Primary care is needed, [facility]      Day of Discharge     Vital Signs:  Temp:  [98.3 °F (36.8 °C)] 98.3 °F (36.8 °C)  Heart Rate:  [80-88] 80  Resp:  [16-18] 16  BP: (107-123)/(64-76) 123/76      Pertinent  and/or Most Recent Results     LAB RESULTS:      Lab 10/20/22  0445   WBC 4.29   HEMOGLOBIN 11.3*   HEMATOCRIT 34.1   PLATELETS 199   NEUTROS ABS 1.81   IMMATURE GRANS (ABS) 0.01   LYMPHS ABS 1.49   MONOS ABS 0.62   EOS ABS 0.33   MCV 81.2                                                 Brief Urine Lab Results     None                "                 Imaging Results (Last 7 Days)     ** No results found for the last 168 hours. **           Labs Pending at Discharge:           Discharge Details        Discharge Medications      New Medications      Instructions Start Date   DULoxetine 60 MG capsule  Commonly known as: CYMBALTA   60 mg, Oral, Daily   Start Date: October 21, 2022        Changes to Medications      Instructions Start Date   lamoTRIgine 25 MG tablet  Commonly known as: LaMICtal  What changed:   medication strength  how much to take  when to take this  additional instructions   50 mg, Oral, 2 Times Daily         Continue These Medications      Instructions Start Date   busPIRone 10 MG tablet  Commonly known as: BUSPAR   10 mg, Oral, 2 Times Daily      COREG PO   Oral, Pt unable to recall dose      lurasidone 40 MG tablet tablet  Commonly known as: LATUDA   40 mg, Oral, Daily         Stop These Medications    ferrous sulfate 325 (65 FE) MG tablet     hydrOXYzine 10 MG tablet  Commonly known as: ATARAX     oxyCODONE-acetaminophen 5-325 MG per tablet  Commonly known as: PERCOCET     Prenatal 27-1 27-1 MG tablet tablet     venlafaxine XR 37.5 MG 24 hr capsule  Commonly known as: EFFEXOR-XR            No Known Allergies      Discharge Disposition:  Home or Self Care    Diet:  Hospital:  Diet Order   Procedures   • Diet Regular         Discharge Activity:   Activity Instructions     Activity as Tolerated            Discharge Condition: Good    CODE STATUS:  Code Status and Medical Interventions:   Ordered at: 10/19/22 1252     Code Status (Patient has no pulse and is not breathing):    CPR (Attempt to Resuscitate)     Medical Interventions (Patient has pulse or is breathing):    Full Support         No future appointments.    Additional Instructions for the Follow-ups that You Need to Schedule     Discharge Follow-up with PCP   As directed       Currently Documented PCP:    Provider, No Known    PCP Phone Number:    None     Follow Up  Details: As needed         Discharge Follow-up with Specified Provider: [facility]   As directed      To: [facility]               Time spent on Discharge including face to face service: 30 minutes    Part of this note may be an electronic transcription/translation of spoken language to printed text using the Dragon dictation system.  Every attempt has been made to correct errors but some transcription errors may be present in the body of the note.    Electronically signed by Nick Calderon MD, 10/20/22, 9:09 AM EDT.

## 2022-10-21 LAB — QT INTERVAL: 393 MS
